# Patient Record
Sex: FEMALE | Race: WHITE
[De-identification: names, ages, dates, MRNs, and addresses within clinical notes are randomized per-mention and may not be internally consistent; named-entity substitution may affect disease eponyms.]

---

## 2018-05-04 ENCOUNTER — HOSPITAL ENCOUNTER (OUTPATIENT)
Dept: HOSPITAL 62 - WI | Age: 68
End: 2018-05-04
Attending: NURSE PRACTITIONER
Payer: COMMERCIAL

## 2018-05-04 DIAGNOSIS — Z12.31: Primary | ICD-10-CM

## 2018-05-04 PROCEDURE — 77067 SCR MAMMO BI INCL CAD: CPT

## 2018-05-04 PROCEDURE — 77063 BREAST TOMOSYNTHESIS BI: CPT

## 2018-05-04 NOTE — WOMENS IMAGING REPORT
EXAM DESCRIPTION:  3D SCREENING MAMMO BILAT



COMPLETED DATE/TIME:  5/4/2018 11:57 am



REASON FOR STUDY:  ROUTINE SCREENING;Z12.31 Z12.31  ENCNTR SCREEN MAMMOGRAM FOR MALIGNANT NEOPLASM OF
 [place]



COMPARISON:  Report from 2008



TECHNIQUE:  Standard craniocaudal and mediolateral oblique views of each breast recorded using digita
l acquisition and breast tomosynthesis.



LIMITATIONS:  None.



FINDINGS:  No masses, calcifications or architectural distortion. No areas of suspicion.

Read with the assistance of CAD.

.[facility] - R2 Cenova Version 1.3

.[facility] - R2 Cenova Version 1.3

.[facility] - R2 Cenova Version 2.4

.[facility] - R2 Cenova Version 2.4

.[facility] - R2  Version 9.2



IMPRESSION:  NORMAL MAMMOGRAM.  BIRADS 1.



BREAST DENSITY:  b. There are scattered areas of fibroglandular density.



BIRAD:  1 NEGATIVE



RECOMMENDATION:  ROUTINE SCREENING

Please continue yearly bilateral screening tomosynthesis in May 2019



COMMENT:  The patient has been notified of the results by letter per SA requirements. Additional no
tification policies are in place for contacting patient with suspicious or incomplete findings.

Quality ID #225: The American College of Radiology recommends an annual screening mammogram for women
 aged 40 years or over. This facility utilizes a reminder system to ensure that all patients receive 
reminder letters, and/or direct phone calls for appointments. This includes reminders for routine scr
eening mammograms, diagnostic mammograms, or other Breast Imaging Interventions when appropriate.  Th
is patient will be placed in the appropriate reminder system.

The American College of Radiology (ACR) has developed recommendations for screening MRI of the breast
s in certain patient populations, to be used in conjunction with mammography.  Breast MRI surveillanc
e may be appropriate for women with more than 20% lifetime risk of developing breast cancer  as deter
mined by genetic testing, significant family history of the disease, or history of mantle radiation f
or Hodgkins Disease.  ACR Practice Guidelines 2008.

[employer]



PQRS 6045F: Fluoroscopic imaging is not utilized for breast tomosynthesis.



TECHNICAL DOCUMENTATION:  FINDING NUMBER: (1)

ASSESSMENT:  (1)

JOB ID:  [id number]

 2011 [employer]- All Rights Reserved



Reading location - IP/workstation name: [facility]

## 2019-01-05 ENCOUNTER — HOSPITAL ENCOUNTER (EMERGENCY)
Dept: HOSPITAL 62 - ER | Age: 69
Discharge: HOME | End: 2019-01-05
Payer: MEDICARE

## 2019-01-05 VITALS — DIASTOLIC BLOOD PRESSURE: 75 MMHG | SYSTOLIC BLOOD PRESSURE: 144 MMHG

## 2019-01-05 DIAGNOSIS — S00.03XA: ICD-10-CM

## 2019-01-05 DIAGNOSIS — E03.0: ICD-10-CM

## 2019-01-05 DIAGNOSIS — E05.00: ICD-10-CM

## 2019-01-05 DIAGNOSIS — F17.200: ICD-10-CM

## 2019-01-05 DIAGNOSIS — S09.90XA: Primary | ICD-10-CM

## 2019-01-05 DIAGNOSIS — S40.211A: ICD-10-CM

## 2019-01-05 DIAGNOSIS — E03.9: ICD-10-CM

## 2019-01-05 DIAGNOSIS — F41.9: ICD-10-CM
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
## 2019-01-05 PROCEDURE — 99284 EMERGENCY DEPT VISIT MOD MDM: CPT

## 2019-01-05 PROCEDURE — 70450 CT HEAD/BRAIN W/O DYE: CPT

## 2019-01-05 PROCEDURE — 72125 CT NECK SPINE W/O DYE: CPT

## 2019-01-05 NOTE — RADIOLOGY REPORT (SQ)
EXAM DESCRIPTION:  CT CERVICAL SPINE WITHOUT



COMPLETED DATE/TIME:  1/5/2019 5:53 pm



REASON FOR STUDY:  fall, Dr Stubbs



COMPARISON:  None.



TECHNIQUE:  Axial images acquired through the cervical spine without intravenous contrast.  Images re
viewed with lung, soft tissue and bone windows.  Reconstructed coronal and sagittal MPR images review
ed.  Images stored on PACS.

All CT scanners at this facility use dose modulation, iterative reconstruction, and/or weight based d
osing when appropriate to reduce radiation dose to as low as reasonably achievable (ALARA).

[facility]: Dose Right  [facility]: CareDose    [facility]: Dose Right    [facility]: Teradose 4D    [facility]: Smart Played



RADIATION DOSE:  CT Rad equipment meets quality standard of care and radiation dose reduction techniq
ues were employed. CTDIvol: 19.2 mGy. DLP: 448 mGy-cm. mGy.



LIMITATIONS:  None.



FINDINGS:  ALIGNMENT: Anatomic.

MINERALIZATION: Normal.

VERTEBRAL BODIES: No fractures or dislocation.

DISCS: Focal moderate disc degenerative disease of C5-C6.

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
william ankylosis.  No fractures.  No dislocation.  No acute findings.

HARDWARE: None in the spine.

VISUALIZED RIBS: No fractures.

LUNG APICES AND SOFT TISSUES: No significant or acute findings.

OTHER: No other significant finding.



IMPRESSION:  No fracture or static subluxation of the cervical spine.



TECHNICAL DOCUMENTATION:  JOB ID:  [id number]

Quality ID # 436: Final reports with documentation of one or more dose reduction techniques (e.g., Au
tomated exposure control, adjustment of the mA and/or kV according to patient size, use of iterative 
reconstruction technique)

 2011 [employer]- All Rights Reserved



Reading location - IP/workstation name: [email or web address]

## 2019-01-05 NOTE — RADIOLOGY REPORT (SQ)
EXAM DESCRIPTION:  CT HEAD WITHOUT



COMPLETED DATE/TIME:  1/5/2019 5:53 pm



REASON FOR STUDY:  fall, Dr Stubbs



COMPARISON:  5/20/2014



TECHNIQUE:  Axial images acquired through the brain without intravenous contrast.  Images reviewed wi
th bone, brain and subdural windows.  Additional sagittal and coronal reconstructions were generated.
 Images stored on PACS.

All CT scanners at this facility use dose modulation, iterative reconstruction, and/or weight based d
osing when appropriate to reduce radiation dose to as low as reasonably achievable (ALARA).

[facility]: Dose Right  [facility]: CareDose    [facility]: Dose Right    [facility]: Teradose 4D    [facility]: Smart [employer]



RADIATION DOSE:  CT Rad equipment meets quality standard of care and radiation dose reduction techniq
ues were employed. CTDIvol: 53.2 mGy. DLP: 1044 mGy-cm. mGy.



LIMITATIONS:  None.



FINDINGS:  VENTRICLES: Normal size and contour.

CEREBRUM: No masses.  No hemorrhage.  No midline shift.  No evidence for acute infarction. Normal gra
y/white matter differentiation. No areas of low density in the white matter.

CEREBELLUM: No masses.  No hemorrhage.  No alteration of density.  No evidence for acute infarction.

EXTRAAXIAL SPACES: No fluid collections.  No masses.

ORBITS AND GLOBE: No intra- or extraconal masses.  Normal contour of globe without masses.

CALVARIUM: No fracture.

PARANASAL SINUSES: No fluid or mucosal thickening.

SOFT TISSUES: No mass or hematoma.

OTHER: No other significant finding.



IMPRESSION:  NO ACUTE INTRACRANIAL IMAGING FINDINGS.

EVIDENCE OF ACUTE STROKE: NO.



COMMENT:  Quality ID # 436: Final reports with documentation of one or more dose reduction techniques
 (e.g., Automated exposure control, adjustment of the mA and/or kV according to patient size, use of 
iterative reconstruction technique)



TECHNICAL DOCUMENTATION:  JOB ID:  [id number]

 2011 [employer]- All Rights Reserved



Reading location - IP/workstation name: [email or web address]

## 2019-01-05 NOTE — ER DOCUMENT REPORT
ED General





- General


Chief Complaint: Fall


Stated Complaint: FALL


Time Seen by Provider: 01/05/19 18:00


Mode of Arrival: Wheelchair


Information source: Patient


Notes: 





68-year-old female with Graves' disease, hypothyroidism, anxiety presents via 

EMS after a slip and fall at home.  Patient states that she was walking into her

her dog kennel when she accidentally stepped in the water bowl causing her to 

slip backwards striking her head on the fence post and striking her right 

shoulder onto the fence.   witnessed the fall and states that patient did

not have a loss of consciousness.  She was able to get up and walk independently

but then became anxious and started complaining of shortness of breath.  Patient

does state that she took clonazepam prior to arrival due to her anxiety.  

Patient denies preceding chest pain, dizziness, shortness of breath.  She states

she has otherwise been well.  She is not on any blood thinning medications.


TRAVEL OUTSIDE OF THE [place] IN LAST 30 DAYS: No





- HPI


Onset: Just prior to arrival


Onset/Duration: Sudden


Quality of pain: Achy, Throbbing


Severity: Moderate


Associated symptoms: Shortness of breath.  denies: Chest pain, Headache, Nausea,

Vomiting, Weakness


Exacerbated by: Denies


Relieved by: Denies


Similar symptoms previously: No


Recently seen / treated by doctor: No





- Related Data


Allergies/Adverse Reactions: 


                                        





cephalexin monohydrate [From Keflex] Allergy (Intermediate, Verified 08/18/14 

15:21)


   rash


nitrofurantoin macrocrystalline [From Macrodantin] Allergy (Mild, Verified 

08/18/14 15:21)


   severe nausea and vomiting


Penicillins Allergy (Mild, Verified 08/18/14 15:21)


   rash


aspirin [Aspirin] Allergy (Verified 08/18/14 15:21)


   


ivp dye Allergy (Mild, Uncoded 08/18/14 15:21)


   rash











Past Medical History





- Social History


Smoking Status: Current Every Day Smoker


Chew tobacco use (# tins/day): No


Frequency of alcohol use: None


Drug Abuse: None


Family History: Reviewed & Not Pertinent


Patient has suicidal ideation: No


Patient has homicidal ideation: No





- Past Medical History


Cardiac Medical History: 


   Denies: Hx Heart Attack, Hx Hypertension


Pulmonary Medical History: 


   Denies: Hx Asthma


Neurological Medical History: Denies: Hx Cerebrovascular Accident, Hx Seizures


Renal/ Medical History: Denies: Hx Peritoneal Dialysis


GI Medical History: Denies: Hx Hepatitis, Hx Hiatal Hernia, Hx Ulcer


Psychiatric Medical History: Reports: Hx Anxiety, Hx Depression


Infectious Medical History: Denies: Hx Hepatitis


Past Surgical History: Reports: Hx Tonsillectomy.  Denies: Hx Hysterectomy, Hx 

Mastectomy, Hx Open Heart Surgery, Hx Pacemaker





- Immunizations


Hx Diphtheria, Pertussis, Tetanus Vaccination: Yes





Physical Exam





- Vital signs


Vitals: 


                                        











Temp Pulse Resp BP Pulse Ox


 


 97.9 F   81   16   165/80 H  97 


 


 01/05/19 17:30  01/05/19 17:30  01/05/19 17:30  01/05/19 17:30  01/05/19 17:30














Course





- Re-evaluation


Re-evalutation: 





01/05/19 19:00





                                        





Cervical Spine CT  01/05/19 00:00


IMPRESSION:  No fracture or static subluxation of the cervical spine.


 








Head CT  01/05/19 00:00


IMPRESSION:  NO ACUTE INTRACRANIAL IMAGING FINDINGS.


EVIDENCE OF ACUTE STROKE: NO.


 








Shoulder X-Ray  01/05/19 00:00


IMPRESSION:  NORMAL STUDY.


 











                                        











Temp Pulse Resp BP Pulse Ox


 


 97.9 F   81   16   165/80 H  97 


 


 01/05/19 17:30  01/05/19 17:30  01/05/19 17:30  01/05/19 17:30  01/05/19 17:30








Presentation of a well appearing elderly patient in no acute distress, vitals 

within normal limits after a mechanical fall.  Patient denies a syncopal episode

 as the cause for today's fall.  No focal neurologic deficits on exam, no 

evidence of basilar skull fracture on exam without evidence of hemotympanum, 

raccoon eyes, or periauricular hematoma.  No papilledema.  Patient is not on 

anticoagulation.  GCS is 15.  No loss of consciousness.  No episodes of vom

iting.  However, based on patient's age a CT of the head has been obtained which

 is negative for any acute intracranial bleed.  Likewise, patient was unable to 

be clinically cleared due to age by [place] cervical spine criteria.  A CT of 

the cervical spine was also obtained and likewise is negative for any acute 

fracture. No indication for further imaging of the cervical spine. Patient has 

no focal deformities or limited range of motion in any joint space.  Chest and 

abdominal exam are benign without any focal tenderness, shortness of breath, or 

bruising over the chest or abdominal wall.  Patient has no flank tenderness.  

Patient is complaining of right shoulder pain.  She does have an abrasion over 

the right shoulder.  X-rays were obtained and showed no acute fracture or 

dislocation.  On reevaluation patient now complaining of a mild frontal 

headache.  She is declining any medication and states that she will take Tylenol

 at home.  This time will discharge with return precautions and follow-up 

recommendations.  Verbal discharge instructions given a the bedside and 

opportunity for questions given. Medication warnings reviewed. Patient is in 

agreement with this plan and has verbalized understanding of return precautions 

and the need for primary care follow-up in the next 24-72 hours.


01/05/19 19:11








- Vital Signs


Vital signs: 


                                        











Temp Pulse Resp BP Pulse Ox


 


 97.9 F   81   16   165/80 H  97 


 


 01/05/19 17:30  01/05/19 17:30  01/05/19 17:30  01/05/19 17:30  01/05/19 17:30














- Diagnostic Test


Radiology reviewed: Image reviewed, Reports reviewed





Discharge





- Discharge


Clinical Impression: 


 Elevated blood pressure reading





Closed head injury


Qualifiers:


 Encounter type: initial encounter Qualified Code(s): S09.90XA - Unspecified 

injury of head, initial encounter





Abrasion of right shoulder area


Qualifiers:


 Encounter type: initial encounter Qualified Code(s): S40.211A - Abrasion of 

right shoulder, initial encounter





Contusion


Qualifiers:


 Encounter type: initial encounter Contusion area: head Contusion of head 

detail: scalp Qualified Code(s): S00.03XA - Contusion of scalp, initial 

encounter





Fall


Qualifiers:


 Encounter type: initial encounter Qualified Code(s): W19.XXXA - Unspecified 

fall, initial encounter





Condition: Good


Disposition: HOME, SELF-CARE


Instructions:  Abrasions ([facility]), Contusion ([facility]), Head Injury Precautions ([facility])


Additional Instructions: 


You have likely sustained a contusion (bruise) to your head.  If you had a CT 

scan done, it did not show any evidence of serious injury or bleeding.  Symptoms

 to expect from a concussion include nausea, mild to moderate headache, 

difficulty concentrating or sleeping, and mild lightheadedness.  These symptoms 

should improve over the next few days to weeks.  Return to the emergency 

department or follow-up with your primary care doctor if your symptoms are not 

improving over this time.  Signs of a more serious head injury include vomiting,

 severe headache, excessive sleepiness or confusion, and weakness or numbness in

 your face, arms or legs.  Return immediately to the Emergency Department if you

 experience any of these more concerning symptoms.  Rest, avoid strenuous 

physical or mental activity, and avoid activities that could potentially result 

in another head injury until all your symptoms from this head injury are 

completely resolved for at least 2-3 weeks.  If you participate in sports, get 

cleared by your doctor or  before returning to play.  You may take 

ibuprofen or acetaminophen over the counter according to label instructions for 

mild headache or scalp soreness.


Forms:  Elevated Blood Pressure


Referrals: 


MARY REBOLLAR FNP [Primary Care Provider] - Follow up as needed

## 2019-01-05 NOTE — RADIOLOGY REPORT (SQ)
EXAM DESCRIPTION:  SHOULDER RIGHT 2 OR MORE VIEWS



COMPLETED DATE/TIME:  1/5/2019 6:15 pm



REASON FOR STUDY:  fall, Dr Stubbs



COMPARISON:  None.



NUMBER OF VIEWS:  Three views right shoulder.



LIMITATIONS:  None.



FINDINGS:  There is no acute or significant bone, joint or soft tissue abnormality.

OTHER: No other significant finding.



IMPRESSION:  NORMAL STUDY.



TECHNICAL DOCUMENTATION:  JOB ID:  [id number]



Reading location - IP/workstation name: [email or web address]